# Patient Record
Sex: FEMALE | Race: WHITE | Employment: FULL TIME | URBAN - METROPOLITAN AREA
[De-identification: names, ages, dates, MRNs, and addresses within clinical notes are randomized per-mention and may not be internally consistent; named-entity substitution may affect disease eponyms.]

---

## 2021-08-15 ENCOUNTER — APPOINTMENT (OUTPATIENT)
Dept: CT IMAGING | Facility: CLINIC | Age: 56
End: 2021-08-15
Attending: EMERGENCY MEDICINE
Payer: COMMERCIAL

## 2021-08-15 ENCOUNTER — HOSPITAL ENCOUNTER (EMERGENCY)
Facility: CLINIC | Age: 56
Discharge: HOME | End: 2021-08-15
Attending: EMERGENCY MEDICINE | Admitting: EMERGENCY MEDICINE
Payer: COMMERCIAL

## 2021-08-15 VITALS
RESPIRATION RATE: 18 BRPM | HEART RATE: 77 BPM | WEIGHT: 145 LBS | TEMPERATURE: 96.8 F | DIASTOLIC BLOOD PRESSURE: 89 MMHG | SYSTOLIC BLOOD PRESSURE: 131 MMHG | OXYGEN SATURATION: 95 %

## 2021-08-15 DIAGNOSIS — R07.9 CHEST PAIN, UNSPECIFIED TYPE: ICD-10-CM

## 2021-08-15 LAB
ALBUMIN SERPL-MCNC: 4 G/DL (ref 3.4–5)
ALP SERPL-CCNC: 75 U/L (ref 40–150)
ALT SERPL W P-5'-P-CCNC: 35 U/L (ref 0–50)
ANION GAP SERPL CALCULATED.3IONS-SCNC: 6 MMOL/L (ref 3–14)
AST SERPL W P-5'-P-CCNC: 22 U/L (ref 0–45)
ATRIAL RATE - MUSE: 80 BPM
BASOPHILS # BLD AUTO: 0.1 10E3/UL (ref 0–0.2)
BASOPHILS NFR BLD AUTO: 1 %
BILIRUB SERPL-MCNC: 0.2 MG/DL (ref 0.2–1.3)
BUN SERPL-MCNC: 11 MG/DL (ref 7–30)
CALCIUM SERPL-MCNC: 8.8 MG/DL (ref 8.5–10.1)
CHLORIDE BLD-SCNC: 108 MMOL/L (ref 94–109)
CO2 SERPL-SCNC: 25 MMOL/L (ref 20–32)
CREAT SERPL-MCNC: 0.72 MG/DL (ref 0.52–1.04)
D DIMER PPP FEU-MCNC: 0.9 UG/ML FEU (ref 0–0.5)
DIASTOLIC BLOOD PRESSURE - MUSE: NORMAL MMHG
EOSINOPHIL # BLD AUTO: 0.4 10E3/UL (ref 0–0.7)
EOSINOPHIL NFR BLD AUTO: 4 %
ERYTHROCYTE [DISTWIDTH] IN BLOOD BY AUTOMATED COUNT: 11.9 % (ref 10–15)
GFR SERPL CREATININE-BSD FRML MDRD: >90 ML/MIN/1.73M2
GLUCOSE BLD-MCNC: 76 MG/DL (ref 70–99)
HCT VFR BLD AUTO: 40.7 % (ref 35–47)
HGB BLD-MCNC: 13.2 G/DL (ref 11.7–15.7)
HOLD SPECIMEN: NORMAL
IMM GRANULOCYTES # BLD: 0 10E3/UL
IMM GRANULOCYTES NFR BLD: 0 %
INTERPRETATION ECG - MUSE: NORMAL
LYMPHOCYTES # BLD AUTO: 2.9 10E3/UL (ref 0.8–5.3)
LYMPHOCYTES NFR BLD AUTO: 36 %
MCH RBC QN AUTO: 33.2 PG (ref 26.5–33)
MCHC RBC AUTO-ENTMCNC: 32.4 G/DL (ref 31.5–36.5)
MCV RBC AUTO: 103 FL (ref 78–100)
MONOCYTES # BLD AUTO: 0.7 10E3/UL (ref 0–1.3)
MONOCYTES NFR BLD AUTO: 8 %
NEUTROPHILS # BLD AUTO: 4.1 10E3/UL (ref 1.6–8.3)
NEUTROPHILS NFR BLD AUTO: 51 %
NRBC # BLD AUTO: 0 10E3/UL
NRBC BLD AUTO-RTO: 0 /100
P AXIS - MUSE: 53 DEGREES
PLATELET # BLD AUTO: 360 10E3/UL (ref 150–450)
POTASSIUM BLD-SCNC: 3.6 MMOL/L (ref 3.4–5.3)
PR INTERVAL - MUSE: 164 MS
PROT SERPL-MCNC: 7.6 G/DL (ref 6.8–8.8)
QRS DURATION - MUSE: 82 MS
QT - MUSE: 372 MS
QTC - MUSE: 429 MS
R AXIS - MUSE: 20 DEGREES
RBC # BLD AUTO: 3.97 10E6/UL (ref 3.8–5.2)
SODIUM SERPL-SCNC: 139 MMOL/L (ref 133–144)
SYSTOLIC BLOOD PRESSURE - MUSE: NORMAL MMHG
T AXIS - MUSE: 36 DEGREES
TROPONIN I SERPL-MCNC: <0.015 UG/L (ref 0–0.04)
VENTRICULAR RATE- MUSE: 80 BPM
WBC # BLD AUTO: 8.1 10E3/UL (ref 4–11)

## 2021-08-15 PROCEDURE — 80053 COMPREHEN METABOLIC PANEL: CPT | Performed by: EMERGENCY MEDICINE

## 2021-08-15 PROCEDURE — 250N000011 HC RX IP 250 OP 636: Performed by: EMERGENCY MEDICINE

## 2021-08-15 PROCEDURE — 99285 EMERGENCY DEPT VISIT HI MDM: CPT | Mod: 25

## 2021-08-15 PROCEDURE — 84484 ASSAY OF TROPONIN QUANT: CPT | Performed by: EMERGENCY MEDICINE

## 2021-08-15 PROCEDURE — 250N000013 HC RX MED GY IP 250 OP 250 PS 637: Performed by: EMERGENCY MEDICINE

## 2021-08-15 PROCEDURE — 71275 CT ANGIOGRAPHY CHEST: CPT | Mod: 59

## 2021-08-15 PROCEDURE — 85379 FIBRIN DEGRADATION QUANT: CPT | Performed by: EMERGENCY MEDICINE

## 2021-08-15 PROCEDURE — 250N000009 HC RX 250: Performed by: EMERGENCY MEDICINE

## 2021-08-15 PROCEDURE — 36415 COLL VENOUS BLD VENIPUNCTURE: CPT | Performed by: EMERGENCY MEDICINE

## 2021-08-15 PROCEDURE — 96374 THER/PROPH/DIAG INJ IV PUSH: CPT

## 2021-08-15 PROCEDURE — 85004 AUTOMATED DIFF WBC COUNT: CPT | Performed by: EMERGENCY MEDICINE

## 2021-08-15 PROCEDURE — 93005 ELECTROCARDIOGRAM TRACING: CPT

## 2021-08-15 RX ORDER — IOPAMIDOL 755 MG/ML
59 INJECTION, SOLUTION INTRAVASCULAR ONCE
Status: COMPLETED | OUTPATIENT
Start: 2021-08-15 | End: 2021-08-15

## 2021-08-15 RX ADMIN — LIDOCAINE HYDROCHLORIDE 30 ML: 20 SOLUTION ORAL; TOPICAL at 21:22

## 2021-08-15 RX ADMIN — FAMOTIDINE 20 MG: 10 INJECTION, SOLUTION INTRAVENOUS at 22:44

## 2021-08-15 RX ADMIN — IOPAMIDOL 59 ML: 755 INJECTION, SOLUTION INTRAVENOUS at 22:50

## 2021-08-15 RX ADMIN — SODIUM CHLORIDE 95 ML: 9 INJECTION, SOLUTION INTRAVENOUS at 22:50

## 2021-08-15 ASSESSMENT — ENCOUNTER SYMPTOMS
SHORTNESS OF BREATH: 1
FEVER: 0
NAUSEA: 0
LIGHT-HEADEDNESS: 1
COUGH: 1

## 2021-08-16 NOTE — ED PROVIDER NOTES
History   Chief Complaint:  Chest Pain     HPI   Monserrat Braga is a 55 year old female with history of asthma who presents with chest pain. The patient reports that she developed chest pain and shortness of breath after swimming approximately 4 hours ago. She describes this pain as tight and it does not radiate to her shoulder or back. Her pain is worsened when she swallows food and water. The patient states that she has a mild cough and denies fever, nausea, abdominal pain, and congestion. She states that her shortness of breath is now resolved. There have been no recent changes to her diet and she has no history of similar symptoms. Of note, she drove to [place] with her  2.5 weeks ago.      Review of Systems   Constitutional: Negative for fever.   Respiratory: Positive for cough and shortness of breath.    Cardiovascular: Positive for chest pain.   Gastrointestinal: Negative for nausea.   Neurological: Positive for light-headedness.   All other systems reviewed and are negative.    Allergies:  The patient has no known allergies.     Medications:  Inhaler     Past Medical History:    Asthma     Family History:    Hypertension  Hyperlipidemia  Stroke   Patient denies a family history of heart disease and PE/DVT    Social History:    Denies tobacco use  Accompanied by     Physical Exam     Patient Vitals for the past 24 hrs:   BP Temp Temp src Pulse Resp SpO2 Weight   08/15/21 2200 131/89 -- -- 77 18 95 % --   08/15/21 2130 (!) 139/90 -- -- 77 11 96 % --   08/15/21 2100 (!) 128/95 -- -- 84 19 -- --   08/15/21 2052 (!) 156/98 96.8  F (36  C) Temporal 92 18 93 % 65.8 kg (145 lb)     Physical Exam  General: Appears well-developed and well-nourished.   Head: No signs of trauma.   Mouth/Throat: Oropharynx is clear and moist.   CV: Normal rate and regular rhythm.    Resp: Effort normal and breath sounds normal. No respiratory distress.   GI: Soft. There is no tenderness.  No rebound or guarding.   Normal bowel sounds.    MSK: Normal range of motion. no edema. No Calf tenderness.  Neuro: The patient is alert and oriented. Speech normal.  Skin: Skin is warm and dry. No rash noted.   Psych: normal mood and affect. behavior is normal.       Emergency Department Course   ECG  ECG taken at 2053, ECG read at 2104  Normal sinus rhythm  Possible left atrial enlargement  Possible anterior infarct, age undetermined  Abnormal ECG   Rate 80 bpm. LA interval 164 ms. QRS duration 82 ms. QT/QTc 372/429 ms. P-R-T axes 53 20 36.     Imaging:  CT Chest Pulmonary Embolism w Contrast  1.  6 mm liver lesion too small to characterize definitively though more likely to be benign assuming no primary. Prior would be needed to confirm. If none and low clinical suspicion combination enhanced CT or MRI as an outpatient recommended.  2.  No pulmonary embolus aortic aneurysm or dissection.  As per radiology    Laboratory:  CBC: WBC 8.1, HGB 13.2,    CMP:(Creatinine 0.72)     Troponin (Collected 2116): <0.015  Ddimer: 0.9 (H)      Emergency Department Course:  Reviewed:  I reviewed nursing notes, vitals and past medical history    Assessments:  2231 I obtained history and examined the patient as noted above.   2336 I rechecked the patient and explained findings.     Interventions:  2122  GI Cocktail - Maalox 15 mL, Viscous Lidocaine 15 mL, 30 mL suspension Oral  2244 Pepcid 20 mg IV    Disposition:  The patient was discharged to home.       Impression & Plan     Medical Decision Making:  Monserrat Braga Is a 55-year-old woman who presents due to chest pain and some shortness of breath.  Symptoms started while she was swimming this evening with family.  She received a GI cocktail prior to my evaluation and states this did help with her symptoms some.  EKG did not show any concerning ST segment changes or arrhythmias and blood work showed an undetectable troponin.  D-dimer had been obtained and was slightly elevated.  Given this a CT scan  was obtained and this did not show any signs of PE or other acute process.  Patient was informed of the liver finding and recommended to follow with her primary care doctor.  She received Pepcid and continued to feel well in the ER.  I did discuss doing a repeat troponin, but the patient declined this.  Given the reassuring work-up patient was discharged home with outpatient follow-up with her primary care doctor and return for any new or worsening symptoms or further concerns.    Covid-19  Monserrat Braga was evaluated during a global COVID-19 pandemic, which necessitated consideration that the patient might be at risk for infection with the SARS-CoV-2 virus that causes COVID-19.   Applicable protocols for evaluation were followed during the patient's care.     Diagnosis:    ICD-10-CM    1. Chest pain, unspecified type  R07.9        Discharge Medications:  There are no discharge medications for this patient.      Scribe Disclosure:  I, Carina Swenson, am serving as a scribe at 9:05 PM on 8/15/2021 to document services personally performed by Judson Figueroa MD based on my observations and the provider's statements to me.              Philip Pulido MD  08/17/21 [id number]

## 2024-01-17 ENCOUNTER — TRANSFERRED RECORDS (OUTPATIENT)
Dept: HEALTH INFORMATION MANAGEMENT | Facility: CLINIC | Age: 59
End: 2024-01-17
Payer: COMMERCIAL

## 2024-02-28 ENCOUNTER — TRANSFERRED RECORDS (OUTPATIENT)
Dept: HEALTH INFORMATION MANAGEMENT | Facility: CLINIC | Age: 59
End: 2024-02-28
Payer: COMMERCIAL